# Patient Record
Sex: MALE
[De-identification: names, ages, dates, MRNs, and addresses within clinical notes are randomized per-mention and may not be internally consistent; named-entity substitution may affect disease eponyms.]

---

## 2019-04-01 ENCOUNTER — HOSPITAL ENCOUNTER (OUTPATIENT)
Dept: HOSPITAL 88 - OR | Age: 49
Discharge: HOME | End: 2019-04-01
Attending: SURGERY
Payer: COMMERCIAL

## 2019-04-01 VITALS — DIASTOLIC BLOOD PRESSURE: 89 MMHG | SYSTOLIC BLOOD PRESSURE: 124 MMHG

## 2019-04-01 DIAGNOSIS — K44.9: ICD-10-CM

## 2019-04-01 DIAGNOSIS — F17.210: ICD-10-CM

## 2019-04-01 DIAGNOSIS — Z01.810: ICD-10-CM

## 2019-04-01 DIAGNOSIS — K40.90: Primary | ICD-10-CM

## 2019-04-01 DIAGNOSIS — J06.9: ICD-10-CM

## 2019-04-01 DIAGNOSIS — F41.9: ICD-10-CM

## 2019-04-01 PROCEDURE — 93005 ELECTROCARDIOGRAM TRACING: CPT

## 2019-04-01 PROCEDURE — 49505 PRP I/HERN INIT REDUC >5 YR: CPT

## 2019-04-01 NOTE — OPERATIVE REPORT
DATE OF PROCEDURE:  04/01/2019

 

SURGEON:  Maninder Crews MD

 

PREOPERATIVE DIAGNOSIS:  Right inguinal hernia.

 

POSTOPERATIVE DIAGNOSIS:  Right inguinal hernia.

 

PREOPERATIVE INDICATIONS:  Treat disease, prevent hernia-related complications 
such as

incarceration or strangulation. 

 

PROCEDURE:  Open right inguinal hernia repair with mesh.

 

ANESTHESIA:  General.

 

ASSISTANT:  Davonte Huff, surgical first assistant (needed due to 
complexity of

case). 

 

FLUIDS:  1 L of crystalloid.

 

ESTIMATED BLOOD LOSS:  10 mL.

 

DRAINS:  None.

 

COMPLICATIONS:  None.

 

SPECIMENS:  None.

 

GRAFTS:  Polypropylene mesh plug.

 

PROCEDURE IN DETAIL:  The patient was brought to the operating room and was 
intubated

under general endotracheal anesthesia.  A Naidu catheter was placed to 
decompress the

bladder.  He was sterilely prepped and draped in the usual fashion and a 
preprocedure

pause was performed identifying the patient, use of perioperative antibiotics, 
intended

procedure, and staff surgeon.  A primary right iliac fossa incision was made.

Dissection was carried through the subcutaneous tissue down to the level of 
external

oblique aponeurosis and aponeurotic length wise incision was made being careful 
not to

injure any neurovascular structures beneath.  I then encircled the cord 
structures with

a Penrose drain.  The ilioinguinal nerve was identified during dissection.  So, 
this was

ligated in continuity with silk suture and divided.  I was unable to identify 
any

indirect hernia.  However, there was a moderate size direct inguinal hernia.  
This was

repaired with a mesh plug, which was used to approximate the plug to the 
conjoint tendon

as well as shelving edge in the inguinal ligament using multiple 2-0 Prolene 
sutures in

an interrupted fashion.  Once the defect was reinforced with a plug, I then 
closed the

external oblique aponeurosis with 2-0 Vicryl suture in a continuous fashion.  
Hemostasis

was verified.  3-0 Vicryl suture was used to close Mikaela's in interrupted 
fashion.

Skin was closed with 4-0 Monocryl suture in a subcuticular fashion, 0.25% 
Bupivacaine

was used at the incision sites for 

local anesthesia and the patient tolerated the procedure well.  Type of wound is
type 1,

clean.

 

 

 

 

______________________________

Maninder Crews MD

 

[facility] [place]

D:  04/01/2019 08:31:10

T:  04/01/2019 14:11:44

Job #:  [id number]

 

MTDD